# Patient Record
Sex: MALE | URBAN - METROPOLITAN AREA
[De-identification: names, ages, dates, MRNs, and addresses within clinical notes are randomized per-mention and may not be internally consistent; named-entity substitution may affect disease eponyms.]

---

## 2021-07-12 ENCOUNTER — OFFICE VISIT (OUTPATIENT)
Dept: URBAN - METROPOLITAN AREA CLINIC 78 | Facility: CLINIC | Age: 76
End: 2021-07-12
Payer: MEDICARE

## 2021-07-12 DIAGNOSIS — H43.12 VITREOUS HEMORRHAGE, LEFT EYE: ICD-10-CM

## 2021-07-12 DIAGNOSIS — H43.813 BILATERAL VITREOUS DETACHMENT OF EYES: Primary | ICD-10-CM

## 2021-07-12 PROCEDURE — 92134 CPTRZ OPH DX IMG PST SGM RTA: CPT | Performed by: OPHTHALMOLOGY

## 2021-07-12 PROCEDURE — 99204 OFFICE O/P NEW MOD 45 MIN: CPT | Performed by: OPHTHALMOLOGY

## 2021-07-12 ASSESSMENT — INTRAOCULAR PRESSURE
OS: 10
OD: 12

## 2021-07-12 NOTE — IMPRESSION/PLAN
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 Chronic PVD OD Acute PVD OS Plan: Scleral depressed exam reveals no retinal break or retinal detachment OU. Exam/OCT reveals no ERM/ME OU. Discussed risk for developing a retinal tear and retinal detachment secondary to a PVD. Reviewed signs and symptoms of a retinal tear and detachment. Advised patient to contact us immediately for new floaters, flashing lights, or a shadow in the vision. 

Return in 4-5 weeks, OCT OU

## 2021-08-09 ENCOUNTER — OFFICE VISIT (OUTPATIENT)
Dept: URBAN - METROPOLITAN AREA CLINIC 78 | Facility: CLINIC | Age: 76
End: 2021-08-09
Payer: MEDICARE

## 2021-08-09 DIAGNOSIS — Z96.1 PRESENCE OF PSEUDOPHAKIA: ICD-10-CM

## 2021-08-09 PROCEDURE — 92134 CPTRZ OPH DX IMG PST SGM RTA: CPT | Performed by: OPHTHALMOLOGY

## 2021-08-09 PROCEDURE — 92012 INTRM OPH EXAM EST PATIENT: CPT | Performed by: OPHTHALMOLOGY

## 2021-08-09 ASSESSMENT — INTRAOCULAR PRESSURE
OS: 13
OD: 12

## 2021-08-09 NOTE — IMPRESSION/PLAN
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 Chronic PVD OD Acute PVD OS Plan: Exam reveals no retinal break or retinal detachment OU. Exam/OCT reveals no ERM/ME OU. Discussed risk for developing a retinal tear and retinal detachment secondary to a PVD. Reviewed signs and symptoms of a retinal tear and detachment. Advised patient to contact us immediately for new floaters, flashing lights, or a shadow in the vision. 

Return in 4-5 weeks, OCT OU